# Patient Record
Sex: MALE | Race: WHITE | NOT HISPANIC OR LATINO | URBAN - METROPOLITAN AREA
[De-identification: names, ages, dates, MRNs, and addresses within clinical notes are randomized per-mention and may not be internally consistent; named-entity substitution may affect disease eponyms.]

---

## 2024-08-16 ENCOUNTER — OFFICE VISIT (OUTPATIENT)
Dept: URBAN - METROPOLITAN AREA CLINIC 70 | Facility: CLINIC | Age: 34
End: 2024-08-16
Payer: COMMERCIAL

## 2024-08-16 ENCOUNTER — DASHBOARD ENCOUNTERS (OUTPATIENT)
Age: 34
End: 2024-08-16

## 2024-08-16 ENCOUNTER — OFFICE VISIT (OUTPATIENT)
Dept: URBAN - METROPOLITAN AREA CLINIC 70 | Facility: CLINIC | Age: 34
End: 2024-08-16

## 2024-08-16 VITALS
DIASTOLIC BLOOD PRESSURE: 73 MMHG | HEART RATE: 70 BPM | TEMPERATURE: 97.7 F | WEIGHT: 175.4 LBS | SYSTOLIC BLOOD PRESSURE: 113 MMHG | HEIGHT: 67 IN | BODY MASS INDEX: 27.53 KG/M2

## 2024-08-16 DIAGNOSIS — R10.13 EPIGASTRIC PAIN: ICD-10-CM

## 2024-08-16 DIAGNOSIS — Z87.11 HISTORY OF PEPTIC ULCER: ICD-10-CM

## 2024-08-16 PROCEDURE — 99204 OFFICE O/P NEW MOD 45 MIN: CPT | Performed by: REGISTERED NURSE

## 2024-08-16 RX ORDER — PANTOPRAZOLE SODIUM 40 MG/1
1 TABLET TABLET, DELAYED RELEASE ORAL TWICE A DAY
Qty: 180 TABLET | Refills: 3 | OUTPATIENT
Start: 2024-08-16

## 2024-08-16 NOTE — HPI-TODAY'S VISIT:
Pt presents with abdominal pain. He reports intermittent epigastric pain and burning for >5 years. We saw him for these symptoms in 2019. EGD 9/20/19 showed esophagitis and 4 nonbleeding cratered gastric ulcers. Hpylori was positive. He completed the hpylori treatment but never took a PPI beyond that. He failed to f/u after the procedure. He has continued to have symptoms since that time. He started taking some over the counter Prilosec a few days ago which has helped some.

## 2024-08-16 NOTE — PHYSICAL EXAM PSYCH:
normal mood with appropriate affect pREOPERATIVE history and physical     REQUESTING Physician    Dr. Sher De    PRIMARY CARE Physician    Madhu Nielsen MD    HISTORY OF PRESENT ILLNESS  This patient was seen at the request of Dr. De for preoperative clearance. The patient will be undergoing right foot tarsal tunnel release to be performed by Dr. De on March 27, 2019.    Patient has a long-standing history of right-sided foot pain and is scheduled for the above procedure with Dr. De    He continues to smoke about 3-4 cigarettes a day. He denies any headache or chest pain or shortness of breath. He denies nausea or vomiting or abdominal pain or diarrhea.    Patient is otherwise in his usual state of health.    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   • HTN (hypertension)    • Myalgia    • Obesity    • Other vitreous opacities 2/28/2013   • Posterior vitreous detachment of both eyes 2/28/2013   • RAD (reactive airway disease)     childhood    • Tobacco dependence 9/7/2017   • Wears glasses        SURGICAL HISTORY    Past Surgical History:   Procedure Laterality Date   • Back surgery      fusion lumbar    • Cataract extrac w/ intraocular lens imp&ant vit,bilaterl  06/05/2012    x2   • Cholecystectomy  03/01/1999   • Colonoscopy w/ polypectomy  09/08/2005   • Eye surgery      cataract bilaterally    • Hernia repair      x3   • Ir guided biopsy Right 10/19/2017    PAROTID   • Laminectomy     • Occult blood test tube  12/02/2011   • Ptca     • Removal gallbladder     • Spine surgery          ALLERGIES    ALLERGIES:   Allergen Reactions   • Simvastatin Nausea & Vomiting     Myalgia        CURRENT MEDICATIONS    Current Outpatient Medications   Medication Sig Dispense Refill   • lisinopril-hydroCHLOROthiazide (PRINZIDE,ZESTORETIC) 20-12.5 MG per tablet TAKE 2 TABLETS BY MOUTH DAILY 180 tablet 0   • atorvastatin (LIPITOR) 10 MG tablet TAKE 1 TABLET BY MOUTH DAILY 90 tablet 1   • naproxen sodium (ALEVE) 220 MG tablet Take 220 mg by mouth as needed.     • allopurinol (ZYLOPRIM) 100 MG tablet Take 2 tablets by mouth daily. (Patient taking differently: Take 100 mg by mouth daily. ) 180 tablet 1   • aspirin 81 MG tablet Take 81 mg by mouth daily.     • Multiple Vitamins-Minerals (DAILY MULTIPLE VITAMINS/) TABS Take 1 tablet by mouth daily.     • sildenafil (REVATIO) 20 MG tablet Take 2 - 5 tablets as needed 1 hour for erectile dysfunction 50 tablet 0   • gabapentin (NEURONTIN) 300 MG capsule Take 1 capsule by mouth 3 times daily. 90 capsule 6   • nicotine (NICODERM CQ) 21 MG/24HR patch Place 1 patch onto the skin every 24 hours. 28 patch 2     No current facility-administered medications for this visit.        SOCIAL HISTORY    Social History     Socioeconomic History   • Marital status: /Civil Union     Spouse name: None   • Number of children: None   • Years of education: None   • Highest education level: None   Social Needs   • Financial resource strain: None   • Food insecurity - worry: None   • Food insecurity - inability: None   • Transportation needs - medical: None   • Transportation needs - non-medical: None   Occupational History   • None   Tobacco Use   • Smoking status: Current Every Day Smoker     Packs/day: 0.50     Years: 40.00     Pack years: 20.00     Types: Cigarettes   • Smokeless tobacco: Former User     Types: Chew   • Tobacco comment: Smoking socially   Substance and Sexual Activity   • Alcohol use: Yes     Alcohol/week: 1.2 oz     Types: 2 Shots of liquor per week      Comment: socially   • Drug use: No   • Sexual activity: None   Other Topics Concern   •  Service Not Asked   • Blood Transfusions Not Asked   • Caffeine Concern Not Asked   • Occupational Exposure Not Asked   • Hobby Hazards Not Asked   • Sleep Concern Not Asked   • Stress Concern Not Asked   • Weight Concern Not Asked   • Special Diet Not Asked   • Back Care Not Asked   • Exercise Not Asked   • Bike Helmet Not Asked   • Seat Belt Yes   • Self-Exams Not Asked   Social History Narrative   • None       FAMILY HISTORY    Family History   Problem Relation Age of Onset   • Stroke Mother    • Neuropathy Mother         left foot   • Heart disease Father         CHF    • Hypertension Father    • Diabetes Brother         NIDDM   • Hypertension Brother          PHYSICAL EXAM    VITAL SIGNS:    Visit Vitals  /86   Pulse 72   Temp 98.8 °F (37.1 °C) (Temporal)   Resp 18   Ht 6' 3\" (1.905 m)   Wt 117.1 kg   BMI 32.26 kg/m²      Constitutional:  Obese. In no acute distress.   HENT:  Normocephalic. Atraumatic. Bilateral external ears normal. Oropharynx moist. No oral exudates. No tonsillar or uvular edema. Nose normal.   Neck: Normal range of motion. No tenderness. Supple. No stridor.    Eyes:  PERRL (pupils equal, round and reactive to light),  EOMI (extraocular movements intact). Conjunctivae normal. No discharge.   Cardiovascular:  Normal rate. Normal rhythm. No murmurs, gallops, or rubs.    Respiratory:  No respiratory distress. Normal breath sounds. No rales. No wheezing.    Gastrointestinal:  Bowel sounds normal. Soft. No tenderness. No masses. No pulsatile masses.    Integument:  Warm. Dry. No erythema. No rash.    Musculoskeletal:  Intact distal pulses. No edema. No tenderness. No cyanosis. No clubbing. Good range of motion in all major joints. No tenderness to palpation or major deformities noted. Back - no tenderness.   Neurologic:  Alert and oriented x 3. Normal motor function. Normal sensory function. No  focal deficits noted.      ELECTROCARDIOGRAM     normal sinus rhythm. Right bundle branch block, left anterior fascicular block     Assessment    Preoperative physical.    plan    1. Preoperative workup with CBC, metabolic panel, EKG. Cardiology consultation for abnormal EKG .  2. Change in medication regimen before surgery:  No NSAIDs 1 week before surgery    Final decision on risk for anesthesia and optimization for surgery to be made following cardiology clearance     Thank you, Dr. De for allowing me to participate in the care of this patient.         Please CC Dr De

## 2024-09-12 ENCOUNTER — TELEPHONE ENCOUNTER (OUTPATIENT)
Dept: URBAN - METROPOLITAN AREA CLINIC 70 | Facility: CLINIC | Age: 34
End: 2024-09-12

## 2024-10-16 ENCOUNTER — CLAIMS CREATED FROM THE CLAIM WINDOW (OUTPATIENT)
Dept: URBAN - METROPOLITAN AREA SURGERY CENTER 24 | Facility: SURGERY CENTER | Age: 34
End: 2024-10-16
Payer: COMMERCIAL

## 2024-10-16 ENCOUNTER — LAB OUTSIDE AN ENCOUNTER (OUTPATIENT)
Dept: URBAN - METROPOLITAN AREA CLINIC 70 | Facility: CLINIC | Age: 34
End: 2024-10-16

## 2024-10-16 ENCOUNTER — CLAIMS CREATED FROM THE CLAIM WINDOW (OUTPATIENT)
Dept: URBAN - METROPOLITAN AREA CLINIC 4 | Facility: CLINIC | Age: 34
End: 2024-10-16
Payer: COMMERCIAL

## 2024-10-16 DIAGNOSIS — K22.89 OTHER SPECIFIED DISEASE OF ESOPHAGUS: ICD-10-CM

## 2024-10-16 DIAGNOSIS — K31.89 OTHER DISEASES OF STOMACH AND DUODENUM: ICD-10-CM

## 2024-10-16 DIAGNOSIS — K21.9 GASTRO-ESOPHAGEAL REFLUX DISEASE WITHOUT ESOPHAGITIS: ICD-10-CM

## 2024-10-16 DIAGNOSIS — K21.9 ACID REFLUX: ICD-10-CM

## 2024-10-16 DIAGNOSIS — K29.70 GASTRITIS, UNSPECIFIED, WITHOUT BLEEDING: ICD-10-CM

## 2024-10-16 DIAGNOSIS — R10.13 ABDOMINAL DISCOMFORT, EPIGASTRIC: ICD-10-CM

## 2024-10-16 PROCEDURE — 88305 TISSUE EXAM BY PATHOLOGIST: CPT | Performed by: PATHOLOGY

## 2024-10-16 PROCEDURE — 00731 ANES UPR GI NDSC PX NOS: CPT | Performed by: NURSE ANESTHETIST, CERTIFIED REGISTERED

## 2024-10-16 PROCEDURE — 43239 EGD BIOPSY SINGLE/MULTIPLE: CPT | Performed by: INTERNAL MEDICINE

## 2024-10-16 PROCEDURE — 88312 SPECIAL STAINS GROUP 1: CPT | Performed by: PATHOLOGY

## 2024-10-16 RX ORDER — PANTOPRAZOLE SODIUM 40 MG/1
1 TABLET TABLET, DELAYED RELEASE ORAL TWICE A DAY
Qty: 180 TABLET | Refills: 3 | Status: ACTIVE | COMMUNITY
Start: 2024-08-16

## 2024-11-13 ENCOUNTER — OFFICE VISIT (OUTPATIENT)
Dept: URBAN - METROPOLITAN AREA CLINIC 70 | Facility: CLINIC | Age: 34
End: 2024-11-13
Payer: COMMERCIAL

## 2024-11-13 VITALS
BODY MASS INDEX: 26.68 KG/M2 | HEART RATE: 89 BPM | WEIGHT: 170 LBS | HEIGHT: 67 IN | DIASTOLIC BLOOD PRESSURE: 73 MMHG | SYSTOLIC BLOOD PRESSURE: 127 MMHG | TEMPERATURE: 98 F

## 2024-11-13 DIAGNOSIS — R10.13 EPIGASTRIC PAIN: ICD-10-CM

## 2024-11-13 PROCEDURE — 99213 OFFICE O/P EST LOW 20 MIN: CPT | Performed by: REGISTERED NURSE

## 2024-11-13 RX ORDER — PANTOPRAZOLE SODIUM 40 MG/1
1 TABLET TABLET, DELAYED RELEASE ORAL TWICE A DAY
Qty: 180 TABLET | Refills: 3 | Status: ON HOLD | COMMUNITY
Start: 2024-08-16

## 2024-11-13 RX ORDER — NORTRIPTYLINE HYDROCHLORIDE 25 MG/1
1 CAPSULE CAPSULE ORAL
Qty: 90 | Refills: 1 | OUTPATIENT
Start: 2024-11-13

## 2024-11-13 NOTE — HPI-OTHER HISTORIES
Note from OV 8/16/24: Pt presents with abdominal pain. He reports intermittent epigastric pain and burning for >5 years. We saw him for these symptoms in 2019. EGD 9/20/19 showed esophagitis and 4 nonbleeding cratered gastric ulcers. Hpylori was positive. He completed the hpylori treatment but never took a PPI beyond that. He failed to f/u after the procedure. He has continued to have symptoms since that time. He started taking some over the counter Prilosec a few days ago which has helped some. - - - - - - - - - - - - - - - - - - - - - - - -

## 2024-11-13 NOTE — HPI-TODAY'S VISIT:
EGD 10/16/24 showed esophagitis(no Maurice's) and gastritis(no hpylori)  Pt is very anxious and tearful. Epigastric pain has resolved. He has stopped taking the pantoprazole because he thought it was the cause of palpitations. He will occasionally have some heartburn but no ongoing symptoms.

## 2024-11-13 NOTE — PHYSICAL EXAM EYES:
Conjuntiva and eyelids appear normal,  Sclerae : White without injection postural re-education/transfer training/strengthening/balance training/bed mobility training/gait training